# Patient Record
Sex: MALE | Race: BLACK OR AFRICAN AMERICAN | NOT HISPANIC OR LATINO | Employment: FULL TIME | URBAN - METROPOLITAN AREA
[De-identification: names, ages, dates, MRNs, and addresses within clinical notes are randomized per-mention and may not be internally consistent; named-entity substitution may affect disease eponyms.]

---

## 2024-05-13 ENCOUNTER — HOSPITAL ENCOUNTER (EMERGENCY)
Facility: HOSPITAL | Age: 49
Discharge: HOME | End: 2024-05-13
Attending: EMERGENCY MEDICINE
Payer: COMMERCIAL

## 2024-05-13 ENCOUNTER — CLINICAL SUPPORT (OUTPATIENT)
Dept: EMERGENCY MEDICINE | Facility: HOSPITAL | Age: 49
End: 2024-05-13
Payer: COMMERCIAL

## 2024-05-13 VITALS
WEIGHT: 186 LBS | SYSTOLIC BLOOD PRESSURE: 136 MMHG | OXYGEN SATURATION: 98 % | HEIGHT: 66 IN | TEMPERATURE: 98.7 F | DIASTOLIC BLOOD PRESSURE: 95 MMHG | BODY MASS INDEX: 29.89 KG/M2 | RESPIRATION RATE: 16 BRPM | HEART RATE: 76 BPM

## 2024-05-13 DIAGNOSIS — K62.5 RECTAL BLEEDING: Primary | ICD-10-CM

## 2024-05-13 LAB
ALBUMIN SERPL BCP-MCNC: 4.7 G/DL (ref 3.4–5)
ALP SERPL-CCNC: 52 U/L (ref 33–120)
ALT SERPL W P-5'-P-CCNC: 36 U/L (ref 10–52)
ANION GAP SERPL CALC-SCNC: 16 MMOL/L (ref 10–20)
AST SERPL W P-5'-P-CCNC: 16 U/L (ref 9–39)
BASOPHILS # BLD AUTO: 0.06 X10*3/UL (ref 0–0.1)
BASOPHILS NFR BLD AUTO: 1 %
BILIRUB SERPL-MCNC: 1.2 MG/DL (ref 0–1.2)
BUN SERPL-MCNC: 15 MG/DL (ref 6–23)
CALCIUM SERPL-MCNC: 9.8 MG/DL (ref 8.6–10.6)
CHLORIDE SERPL-SCNC: 101 MMOL/L (ref 98–107)
CO2 SERPL-SCNC: 23 MMOL/L (ref 21–32)
CREAT SERPL-MCNC: 1.08 MG/DL (ref 0.5–1.3)
EGFRCR SERPLBLD CKD-EPI 2021: 84 ML/MIN/1.73M*2
EOSINOPHIL # BLD AUTO: 0.24 X10*3/UL (ref 0–0.7)
EOSINOPHIL NFR BLD AUTO: 4.1 %
ERYTHROCYTE [DISTWIDTH] IN BLOOD BY AUTOMATED COUNT: 12.2 % (ref 11.5–14.5)
GLUCOSE SERPL-MCNC: 279 MG/DL (ref 74–99)
HCT VFR BLD AUTO: 42.9 % (ref 41–52)
HGB BLD-MCNC: 15.3 G/DL (ref 13.5–17.5)
IMM GRANULOCYTES # BLD AUTO: 0.01 X10*3/UL (ref 0–0.7)
IMM GRANULOCYTES NFR BLD AUTO: 0.2 % (ref 0–0.9)
LYMPHOCYTES # BLD AUTO: 0.89 X10*3/UL (ref 1.2–4.8)
LYMPHOCYTES NFR BLD AUTO: 15.3 %
MCH RBC QN AUTO: 29.1 PG (ref 26–34)
MCHC RBC AUTO-ENTMCNC: 35.7 G/DL (ref 32–36)
MCV RBC AUTO: 82 FL (ref 80–100)
MONOCYTES # BLD AUTO: 0.41 X10*3/UL (ref 0.1–1)
MONOCYTES NFR BLD AUTO: 7 %
NEUTROPHILS # BLD AUTO: 4.21 X10*3/UL (ref 1.2–7.7)
NEUTROPHILS NFR BLD AUTO: 72.4 %
NRBC BLD-RTO: 0 /100 WBCS (ref 0–0)
PLATELET # BLD AUTO: 293 X10*3/UL (ref 150–450)
POTASSIUM SERPL-SCNC: 4.3 MMOL/L (ref 3.5–5.3)
PROT SERPL-MCNC: 7.5 G/DL (ref 6.4–8.2)
RBC # BLD AUTO: 5.25 X10*6/UL (ref 4.5–5.9)
SODIUM SERPL-SCNC: 136 MMOL/L (ref 136–145)
WBC # BLD AUTO: 5.8 X10*3/UL (ref 4.4–11.3)

## 2024-05-13 PROCEDURE — 85025 COMPLETE CBC W/AUTO DIFF WBC: CPT | Performed by: EMERGENCY MEDICINE

## 2024-05-13 PROCEDURE — 99283 EMERGENCY DEPT VISIT LOW MDM: CPT | Mod: 25

## 2024-05-13 PROCEDURE — 36415 COLL VENOUS BLD VENIPUNCTURE: CPT | Performed by: EMERGENCY MEDICINE

## 2024-05-13 PROCEDURE — 93005 ELECTROCARDIOGRAM TRACING: CPT

## 2024-05-13 PROCEDURE — 80053 COMPREHEN METABOLIC PANEL: CPT | Performed by: EMERGENCY MEDICINE

## 2024-05-13 PROCEDURE — 99283 EMERGENCY DEPT VISIT LOW MDM: CPT | Performed by: EMERGENCY MEDICINE

## 2024-05-13 ASSESSMENT — PAIN SCALES - GENERAL
PAINLEVEL_OUTOF10: 0 - NO PAIN
PAINLEVEL_OUTOF10: 8

## 2024-05-13 ASSESSMENT — COLUMBIA-SUICIDE SEVERITY RATING SCALE - C-SSRS
2. HAVE YOU ACTUALLY HAD ANY THOUGHTS OF KILLING YOURSELF?: NO
6. HAVE YOU EVER DONE ANYTHING, STARTED TO DO ANYTHING, OR PREPARED TO DO ANYTHING TO END YOUR LIFE?: NO
1. IN THE PAST MONTH, HAVE YOU WISHED YOU WERE DEAD OR WISHED YOU COULD GO TO SLEEP AND NOT WAKE UP?: NO

## 2024-05-13 ASSESSMENT — PAIN - FUNCTIONAL ASSESSMENT: PAIN_FUNCTIONAL_ASSESSMENT: 0-10

## 2024-05-13 NOTE — ED TRIAGE NOTES
Patient has had dark red rectal bleeding that he notices even he is not having a bm x1 month. Intermittent lower abdominal pain. Patient also complains of being lightheaded and dizzy

## 2024-05-13 NOTE — ED PROVIDER NOTES
EMERGENCY DEPARTMENT ENCOUNTER      Pt Name: Guanakito Mahmood  MRN: [id number]  Birthdate 1975  Date of evaluation: 5/13/2024  Provider: Kam Mary MD    CHIEF COMPLAINT       Chief Complaint   Patient presents with    Rectal Bleeding         HISTORY OF PRESENT ILLNESS    48-year-old male with history of HTN and HLD presenting to the ED with complaint of rectal bleeding.  He reports that he has had rectal bleeding since 2016 in which it has become more frequent.  Denies any known hemorrhoids.  Notes there is bright red blood when he wipes and sometimes mixed in the stool in which she feels soreness when he has a bowel movement.  States that the blood occurs with both hard and soft stools.  Also endorses intermittent lower abdominal pain but none currently.  He did have a bowel movement this morning with only a small amount of blood he says.  Denies chest pain, shortness of breath, nausea, vomiting, weakness, or urinary symptoms.      History provided by:  Patient      Nursing Notes were reviewed.    PAST MEDICAL HISTORY   No past medical history on file.      SURGICAL HISTORY     No past surgical history on file.      CURRENT MEDICATIONS       Previous Medications    No medications on file       ALLERGIES     Peanut and Penicillins    FAMILY HISTORY     No family history on file.       SOCIAL HISTORY       Social History     Socioeconomic History    Marital status: Single     Spouse name: Not on file    Number of children: Not on file    Years of education: Not on file    Highest education level: Not on file   Occupational History    Not on file   Tobacco Use    Smoking status: Not on file    Smokeless tobacco: Not on file   Substance and Sexual Activity    Alcohol use: Not on file    Drug use: Not on file    Sexual activity: Not on file   Other Topics Concern    Not on file   Social History Narrative    Not on file     Social Determinants of Health     Financial Resource Strain: Not on file   Food Insecurity:  Not on file   Transportation Needs: Not on file   Physical Activity: Not on file   Stress: Not on file   Social Connections: Not on file   Intimate Partner Violence: Not on file   Housing Stability: Not on file       SCREENINGS                        PHYSICAL EXAM    (up to 7 for level 4, 8 or more for level 5)     ED Triage Vitals [05/13/24 1610]   Temperature Heart Rate Respirations BP   37.1 °C (98.7 °F) (!) 104 16 126/69      Pulse Ox Temp src Heart Rate Source Patient Position   98 % -- -- --      BP Location FiO2 (%)     -- --       Physical Exam  Vitals and nursing note reviewed. Exam conducted with a chaperone present (STEVEN Buchanan).   Constitutional:       General: He is not in acute distress.     Appearance: He is well-developed.   HENT:      Head: Normocephalic and atraumatic.      Right Ear: External ear normal.      Left Ear: External ear normal.      Nose: Nose normal. No congestion or rhinorrhea.      Mouth/Throat:      Mouth: Mucous membranes are moist.      Pharynx: No oropharyngeal exudate or posterior oropharyngeal erythema.   Eyes:      General:         Right eye: No discharge.         Left eye: No discharge.      Extraocular Movements: Extraocular movements intact.      Conjunctiva/sclera: Conjunctivae normal.   Cardiovascular:      Rate and Rhythm: Normal rate and regular rhythm.      Pulses: Normal pulses.      Heart sounds: No murmur heard.     No friction rub.   Pulmonary:      Effort: Pulmonary effort is normal. No respiratory distress.      Breath sounds: Normal breath sounds. No stridor. No wheezing or rales.   Abdominal:      General: There is no distension.      Palpations: Abdomen is soft.      Tenderness: There is no abdominal tenderness. There is no right CVA tenderness, left CVA tenderness or guarding.   Genitourinary:     Rectum: No tenderness, external hemorrhoid or internal hemorrhoid.      Comments: Skin tag at inferior border  Musculoskeletal:         General: No swelling,  tenderness, deformity or signs of injury. Normal range of motion.      Cervical back: Neck supple.      Right lower leg: No edema.      Left lower leg: No edema.   Skin:     General: Skin is warm and dry.      Capillary Refill: Capillary refill takes less than 2 seconds.      Coloration: Skin is not jaundiced or pale.      Findings: No lesion or rash.   Neurological:      General: No focal deficit present.      Mental Status: He is alert. Mental status is at baseline.      Cranial Nerves: No cranial nerve deficit.      Sensory: No sensory deficit.      Motor: No weakness.   Psychiatric:         Mood and Affect: Mood normal.         Behavior: Behavior normal.         Thought Content: Thought content normal.         Judgment: Judgment normal.          DIAGNOSTIC RESULTS     LABS:  Labs Reviewed   CBC WITH AUTO DIFFERENTIAL - Abnormal       Result Value    WBC 5.8      nRBC 0.0      RBC 5.25      Hemoglobin 15.3      Hematocrit 42.9      MCV 82      MCH 29.1      MCHC 35.7      RDW 12.2      Platelets 293      Neutrophils % 72.4      Immature Granulocytes %, Automated 0.2      Lymphocytes % 15.3      Monocytes % 7.0      Eosinophils % 4.1      Basophils % 1.0      Neutrophils Absolute 4.21      Immature Granulocytes Absolute, Automated 0.01      Lymphocytes Absolute 0.89 (*)     Monocytes Absolute 0.41      Eosinophils Absolute 0.24      Basophils Absolute 0.06     COMPREHENSIVE METABOLIC PANEL       All other labs were within normal range or not returned as of this dictation.    Imaging  No orders to display        Procedures  Procedures     EMERGENCY DEPARTMENT COURSE/MDM:     ED Course as of 05/13/24 2031   Mon May 13, 2024   2005 Heart Rate: 76 [ES]   2027 HEMOGLOBIN: 15.3 [ES]      ED Course User Index  [ES] Kam Mary MD         Diagnoses as of 05/13/24 2031   Rectal bleeding        Medical Decision Making  48-year-old male presenting to the ED with complaint of bright red rectal bleeding intermittently since  2016.  Patient is tachycardic at 104 but otherwise afebrile, hemodynamically stable on room air, and in no acute distress. On exam he is not tachycardic and well-appearing. Rectal exam unimpressive for obvious hemorrhoid.  Patient does not endorse tearing pain when having a bowel movement and states bleeding occurs with both hard and soft stools.  Basic labs ordered due to tachycardia.    Labs returned unimpressive for systemic inflammation or infection, acute anemia or blood loss, AMBREEN, hepatitis, or electrolyte abnormalities.  Patient updated on findings and recommendation for gastroenterology to follow-up with regarding rectal bleeding and further evaluation and management.  Patient is agreeable with plan.  Strict return precautions provided.  Case and plan discussed in its entirety with attending, Dr. Mayes.        Patient and or family in agreement and understanding of treatment plan.  All questions answered.      I reviewed the case with the attending ED physician. The attending ED physician agrees with the plan. Patient and/or patient´s representative was counseled regarding labs, imaging, likely diagnosis, and plan. All questions were answered.    ED Medications administered this visit:  Medications - No data to display    New Prescriptions from this visit:    New Prescriptions    No medications on file       Follow-up:   Gastroenterology  [phone]  Schedule an appointment as soon as possible for a visit           Final Impression:   1. Rectal bleeding          (Please note that portions of this note were completed with a voice recognition program.  Efforts were made to edit the dictations but occasionally words are mis-transcribed.)     Kam Mary MD  Resident  05/13/24 2031

## 2024-05-14 LAB
ATRIAL RATE: 93 BPM
P AXIS: 61 DEGREES
P OFFSET: 218 MS
P ONSET: 165 MS
PR INTERVAL: 120 MS
Q ONSET: 225 MS
QRS COUNT: 16 BEATS
QRS DURATION: 88 MS
QT INTERVAL: 344 MS
QTC CALCULATION(BAZETT): 427 MS
QTC FREDERICIA: 398 MS
R AXIS: 15 DEGREES
T AXIS: 0 DEGREES
T OFFSET: 397 MS
VENTRICULAR RATE: 93 BPM

## 2024-05-14 NOTE — DISCHARGE INSTRUCTIONS
Seek immediate medical attention should you have:  shortness of breath, chest pain, weakness, confusion, vomiting, or any worsening or concerning symptoms.

## 2024-06-20 ENCOUNTER — OFFICE VISIT (OUTPATIENT)
Dept: GASTROENTEROLOGY | Facility: HOSPITAL | Age: 49
End: 2024-06-20
Payer: COMMERCIAL

## 2024-06-20 VITALS
WEIGHT: 184 LBS | TEMPERATURE: 97.3 F | DIASTOLIC BLOOD PRESSURE: 91 MMHG | BODY MASS INDEX: 29.57 KG/M2 | HEART RATE: 78 BPM | SYSTOLIC BLOOD PRESSURE: 149 MMHG | HEIGHT: 66 IN | OXYGEN SATURATION: 96 %

## 2024-06-20 DIAGNOSIS — K64.9 BLEEDING HEMORRHOID: Primary | ICD-10-CM

## 2024-06-20 DIAGNOSIS — K59.09 CHRONIC CONSTIPATION: ICD-10-CM

## 2024-06-20 PROCEDURE — 99203 OFFICE O/P NEW LOW 30 MIN: CPT | Performed by: INTERNAL MEDICINE

## 2024-06-20 PROCEDURE — 1036F TOBACCO NON-USER: CPT | Performed by: INTERNAL MEDICINE

## 2024-06-20 PROCEDURE — 99213 OFFICE O/P EST LOW 20 MIN: CPT | Performed by: INTERNAL MEDICINE

## 2024-06-20 RX ORDER — AMLODIPINE BESYLATE 10 MG/1
10 TABLET ORAL DAILY
COMMUNITY

## 2024-06-20 RX ORDER — PRAVASTATIN SODIUM 40 MG/1
40 TABLET ORAL
COMMUNITY

## 2024-06-20 ASSESSMENT — ENCOUNTER SYMPTOMS
LIGHT-HEADEDNESS: 0
FEVER: 0
SHORTNESS OF BREATH: 0
CHILLS: 0
ROS GI COMMENTS: SEE HPI
TROUBLE SWALLOWING: 0
COLOR CHANGE: 0
COUGH: 0

## 2024-06-20 ASSESSMENT — PAIN SCALES - GENERAL: PAINLEVEL: 0-NO PAIN

## 2024-06-20 NOTE — PATIENT INSTRUCTIONS
Refer to colorectal surgery office to discuss hemorrhoid treatment.  Continue taking stool softeners for constipation.  Follow-up as needed.

## 2024-06-20 NOTE — PROGRESS NOTES
Subjective     History of Present Illness:   Guanakito Mahmood is a 49 y.o. male with hypertension, diabetes mellitus, dyslipidemia, chronic constipation, and colon polyp who presented to GI clinic for consultation.  Patient reported having intermittent rectal bleeding since 2016.  Patient has seen GI provider at [facility] previously who felt that he has hemorrhoidal bleeding.  Colonoscopy at [facility] on 3/17/23 showed one tubular adenoma and hemorrhoids; recommended surveillance in 7 years.  Patient reports having chronic constipation for which `he takes stool softeners and it helps him to have a BM daily.  Patient has tried hemorrhoidal cream and suppository but the effect only helps temporarily.  He would like to pursue further hemorrhoid therapy such as surgery or procedural intervention.  Patient denies having nausea, vomiting, abdominal pain, or weight loss.    Review of Systems  Review of Systems   Constitutional:  Negative for chills and fever.   HENT:  Negative for trouble swallowing.    Respiratory:  Negative for cough and shortness of breath.    Cardiovascular:  Negative for chest pain.   Gastrointestinal:         See HPI   Skin:  Negative for color change and rash.   Neurological:  Negative for light-headedness.       Past Medical History   has no past medical history on file.     Social History   reports that he has never smoked. He has never used smokeless tobacco. He reports current alcohol use. He reports that he does not use drugs.     Family History  family history is not on file.   No family history of colon cancer.    Allergies  Allergies   Allergen Reactions    Peanut Anaphylaxis    Penicillins Rash       Medications  Current Outpatient Medications   Medication Instructions    amLODIPine (NORVASC) 10 mg, oral, Daily    OMEPRAZOLE ORAL oral    pravastatin (PRAVACHOL) 40 mg, oral, Daily RT        Objective   Visit Vitals  BP (!) 149/91   Pulse 78   Temp 36.3 °C (97.3 °F)      Body mass index is 29.7  kg/m².  Physical Exam  Constitutional:       General: He is not in acute distress.     Appearance: Normal appearance.   HENT:      Head: Normocephalic and atraumatic.      Mouth/Throat:      Mouth: Mucous membranes are moist.   Eyes:      General: No scleral icterus.  Cardiovascular:      Rate and Rhythm: Normal rate and regular rhythm.      Heart sounds: No murmur heard.  Pulmonary:      Effort: Pulmonary effort is normal.      Breath sounds: Normal breath sounds.   Abdominal:      General: Bowel sounds are normal.      Palpations: Abdomen is soft. There is no mass.      Tenderness: There is no abdominal tenderness. There is no guarding or rebound.   Musculoskeletal:         General: No swelling.      Cervical back: Neck supple.   Skin:     General: Skin is warm.      Coloration: Skin is not jaundiced.   Neurological:      Mental Status: He is alert and oriented to person, place, and time.   Psychiatric:         Mood and Affect: Mood normal.         Labs  Lab Results   Component Value Date    WBC 5.8 05/13/2024    HGB 15.3 05/13/2024    HCT 42.9 05/13/2024    MCV 82 05/13/2024     05/13/2024     Lab Results   Component Value Date    GLUCOSE 279 (H) 05/13/2024    CALCIUM 9.8 05/13/2024     05/13/2024    K 4.3 05/13/2024    CO2 23 05/13/2024     05/13/2024    BUN 15 05/13/2024    CREATININE 1.08 05/13/2024     Lab Results   Component Value Date    ALT 36 05/13/2024    AST 16 05/13/2024    ALKPHOS 52 05/13/2024    BILITOT 1.2 05/13/2024       Assessment   Guanakito Mahmood is a 49 y.o. male with hypertension, diabetes mellitus, dyslipidemia, chronic constipation, and colon polyp who presented to GI clinic for consultation.  Patient reports having intermittent rectal bleeding for the past 8 years from hemorrhoidal bleeding.  Colonoscopy in 2023 at [facility], [place] showed one polyp and hemorrhoids.  He has failed medical therapy.  Will refer patient to colorectal surgery to discuss further therapy such as banding  and/or surgery for his hemorrhoids.  Continue taking stool softeners to help with his chronic constipation.  Follow-up as needed.    Plan  Refer to colorectal surgery office to discuss hemorrhoid treatment.  Continue taking stool softeners for constipation.  Follow-up as needed.    Ramon Link MD

## 2024-07-16 ENCOUNTER — APPOINTMENT (OUTPATIENT)
Dept: SURGERY | Facility: CLINIC | Age: 49
End: 2024-07-16
Payer: COMMERCIAL

## 2024-07-16 VITALS
HEIGHT: 66 IN | BODY MASS INDEX: 29.91 KG/M2 | DIASTOLIC BLOOD PRESSURE: 100 MMHG | SYSTOLIC BLOOD PRESSURE: 145 MMHG | HEART RATE: 78 BPM | RESPIRATION RATE: 16 BRPM | WEIGHT: 186.1 LBS

## 2024-07-16 DIAGNOSIS — K60.2 ANAL FISSURE: Primary | ICD-10-CM

## 2024-07-16 DIAGNOSIS — K59.00 CONSTIPATION, UNSPECIFIED CONSTIPATION TYPE: ICD-10-CM

## 2024-07-16 PROCEDURE — 46600 DIAGNOSTIC ANOSCOPY SPX: CPT | Performed by: NURSE PRACTITIONER

## 2024-07-16 PROCEDURE — 99203 OFFICE O/P NEW LOW 30 MIN: CPT | Performed by: NURSE PRACTITIONER

## 2024-07-16 PROCEDURE — 3008F BODY MASS INDEX DOCD: CPT | Performed by: NURSE PRACTITIONER

## 2024-07-16 ASSESSMENT — PAIN SCALES - GENERAL: PAINLEVEL: 0-NO PAIN

## 2024-07-16 NOTE — PROGRESS NOTES
Chief complaint:  Bleeding hemorrhoids    History Of Present Illness  Subjective   Guanakito Mahmood was referred by Dr. Link  for evaluation of hemorrhoids.    Pt is here with his mother.    Pain: No  Protruding Tissue: No  Bleeding: Yes - BRBPR per every day. Notes blood on the toilet tissue and in the toilet bowel.      Bowel habits:  Moves bowels daily  Stool is hard  He does strains and spends > 10 minutes on the toilet to have a BM    Dietary history:   Eats a high fiber diet eats some fruits  Drinks approximately 64+ oz water daily  Exercise: dances once weekly   Fiber supplement: has never tried a fiber supplement  Started taking Miralax at the end of June once weekly.    Colonoscopy: Colonoscopy at [facility] 2023. One polyps and hemorrhoids. Repeat in 7 years per patient    PMH: HTN, DLD, DM  PSH: Denies an surgeries  Family history: Father with colon polyps  Tobacco use: Denies  Alcohol use: Drinks on the weekends. 2-3 drinks per day  Recreation drugs: Denies    Denies any anal trauma, anal sex, or anal surgeries.  Takes Ibuprofen. Last dose was yesterday.    Review of Systems    Constitutional: Negative for fever, chills, anorexia, weight loss, malaise   ENMT: Negative for nasal discharge, congestion, ear pain, mouth pain, throat pain  Respiratory: Negative for cough, hemoptysis, wheezing, shortness of breath  Cardiac: Negative for chest pain, dyspnea on exertion, orthopnea, palpitations, syncope. HTN, DLD  Gastrointestinal: Negative for nausea, vomiting, diarrhea, constipation, abdominal pain  Genitourinary: Negative for discharge, dysuria, flank pain, frequency, hematuria  Musculoskeletal: Negative for decreased ROM, pain, swelling, weakness   Neurological: Negative for dizziness, confusion, headache, seizures, syncope   Psychiatric: Negative for mood changes, anxiety, hallucinations, sleep changes, suicidal ideas  Skin: Negative for mass, pain, itching, rash, ulcer   Endocrine: Negative for heat  intolerance, cold intolerance, excessive sweating, polyuria, excess thirst. +DM  Hematologic/Lymph: Negative for anemia, bruising, easy bleeding, night sweats, petechiae, history of DVT/PE or cancer   Allergic/Immunologic: Negative for anaphylaxis, itchy/ teary eyes, itching, sneezing, swelling       Physical Exam  Constitutional: Well developed, awake/alert/oriented x3, no distress, alert and cooperative   Eyes: Sclera anicteric, no conjunctival inflammation, conjugate gaze   ENMT: mucous membranes moist, no apparent injury,   Head/Neck: Neck supple, no apparent injury, trachea midline, no bruits   Respiratory/Thorax: Patent airways, CTAB, normal breath sounds with good chest expansion  Cardiovascular: Regular, rate and rhythm, no murmurs, normal S1 and S2   Gastrointestinal: Nondistended, soft, non-tender  Extremities: normal extremities, no edema  Neurological: alert and oriented x3, normal strength, Normal gait   Psychological: Appropriate mood and behavior   Skin: Warm and dry, no lesions, no rashes   Anorectal: Normal tone, no external hemorrhoids or lesions, smooth mucosa. TASH was comfortable.    Patient ID: Guanakito Mahmood is a 49 y.o. male.    Anoscopy    Date/Time: 7/17/2024 3:27 PM    Performed by: CLAUDIA Thomas  Authorized by: CLAUDIA Thomas    Consent:     Consent obtained:  Verbal    Consent given by:  Patient    Risks, benefits, and alternatives were discussed: yes      Risks discussed:  Bleeding and pain  Universal protocol:     Procedure explained and questions answered to patient or proxy's satisfaction: yes      Patient identity confirmed:  Verbally with patient  Indications:     Indications: rectal bleeding    Procedure details:     Internal hemorrhoids: yes      Anal fissures: yes    Post-procedure details:     Procedure completion:  Tolerated  Comments:      Posterior anal fissure    A chaperone was present during the exam, ARMANI Meneses.  Once I was done with anoscopy I  questioned him again about pain with Bm and after BM. And he did admit to having pain.     Assessment:  Internal hemorrhoids  Posterior anal fissure  Constipation    Plan:  -Ok to continue taking Miralax daily with the Metamucil  -Nifedipine 0.2 % ointment. Apply to anus 2-3 times daily.  -Increase fluid intake: aim for 8-10, 8 oz glasses of water daily. Other beverages, such as juice, coffee, or tea may count toward this goal.  -Increase high fiber foods: fruits , vegetables, whole grains, and beans. Aim for 25-35 grams per day.  -Add a powder fiber supplement daily. Like Metamucil, Citrucel daily-follow instructions on the bottle.  -Avoiding sitting on the toilet for prolonged periods of time  -Avoid straining  -Follow-up in 6 weeks  -Follow up with PCP for elevated HGBAiC    LESTER Thomas-CNP

## 2024-07-16 NOTE — PATIENT INSTRUCTIONS
Posterior anal fissure    -Ok to continue taking Miralax daily with the Metamucil  -Nifedipine 0.2 % ointment. Apply to anus 2-3 times daily.  -Increase fluid intake: aim for 8-10, 8 oz glasses of water daily. Other beverages, such as juice, coffee, or tea may count toward this goal.  -Increase high fiber foods: fruits , vegetables, whole grains, and beans. Aim for 25-35 grams per day.  -Add a powder fiber supplement daily. Like Metamucil, Citrucel daily-follow instructions on the bottle.  -Avoiding sitting on the toilet for prolonged periods of time  -Avoid straining  -Follow-up in 6 weeks  -Follow up with PCP for elevated HGBAiC    It was a pleasure to see you in the office today.  If you have any questions or concerns, please let me know.    Sincerely,  Bianka Bravo  Certified Nurse Practitioner-Colorectal surgery    [email or web address]  [phone] (Confidential voice mail)

## 2024-07-17 PROCEDURE — 46600 DIAGNOSTIC ANOSCOPY SPX: CPT | Performed by: NURSE PRACTITIONER

## 2024-07-17 ASSESSMENT — ENCOUNTER SYMPTOMS: RECTAL BLEEDING: 1

## 2024-08-27 ENCOUNTER — APPOINTMENT (OUTPATIENT)
Dept: SURGERY | Facility: CLINIC | Age: 49
End: 2024-08-27
Payer: COMMERCIAL

## 2024-09-13 ENCOUNTER — HOSPITAL ENCOUNTER (EMERGENCY)
Facility: HOSPITAL | Age: 49
Discharge: HOME | End: 2024-09-14
Attending: EMERGENCY MEDICINE
Payer: COMMERCIAL

## 2024-09-13 DIAGNOSIS — F10.920 ALCOHOLIC INTOXICATION WITHOUT COMPLICATION (CMS-HCC): Primary | ICD-10-CM

## 2024-09-13 PROCEDURE — 99285 EMERGENCY DEPT VISIT HI MDM: CPT | Performed by: EMERGENCY MEDICINE

## 2024-09-13 PROCEDURE — 99283 EMERGENCY DEPT VISIT LOW MDM: CPT

## 2024-09-13 RX ORDER — ONDANSETRON HYDROCHLORIDE 2 MG/ML
4 INJECTION, SOLUTION INTRAVENOUS ONCE
Status: DISCONTINUED | OUTPATIENT
Start: 2024-09-13 | End: 2024-09-14

## 2024-09-13 ASSESSMENT — COLUMBIA-SUICIDE SEVERITY RATING SCALE - C-SSRS
4. HAVE YOU HAD THESE THOUGHTS AND HAD SOME INTENTION OF ACTING ON THEM?: NO
1. IN THE PAST MONTH, HAVE YOU WISHED YOU WERE DEAD OR WISHED YOU COULD GO TO SLEEP AND NOT WAKE UP?: YES
2. HAVE YOU ACTUALLY HAD ANY THOUGHTS OF KILLING YOURSELF?: NO
5. HAVE YOU STARTED TO WORK OUT OR WORKED OUT THE DETAILS OF HOW TO KILL YOURSELF? DO YOU INTEND TO CARRY OUT THIS PLAN?: NO
6. HAVE YOU EVER DONE ANYTHING, STARTED TO DO ANYTHING, OR PREPARED TO DO ANYTHING TO END YOUR LIFE?: NO

## 2024-09-14 ENCOUNTER — CLINICAL SUPPORT (OUTPATIENT)
Dept: EMERGENCY MEDICINE | Facility: HOSPITAL | Age: 49
End: 2024-09-14
Payer: COMMERCIAL

## 2024-09-14 VITALS
OXYGEN SATURATION: 96 % | TEMPERATURE: 97.3 F | DIASTOLIC BLOOD PRESSURE: 74 MMHG | RESPIRATION RATE: 18 BRPM | HEART RATE: 71 BPM | SYSTOLIC BLOOD PRESSURE: 124 MMHG

## 2024-09-14 LAB
ALBUMIN SERPL BCP-MCNC: 5 G/DL (ref 3.4–5)
ALP SERPL-CCNC: 44 U/L (ref 33–120)
ALT SERPL W P-5'-P-CCNC: 22 U/L (ref 10–52)
AMPHETAMINES UR QL SCN: NORMAL
ANION GAP SERPL CALC-SCNC: 20 MMOL/L (ref 10–20)
APAP SERPL-MCNC: <10 UG/ML
AST SERPL W P-5'-P-CCNC: 20 U/L (ref 9–39)
ATRIAL RATE: 75 BPM
BARBITURATES UR QL SCN: NORMAL
BASOPHILS # BLD AUTO: 0.03 X10*3/UL (ref 0–0.1)
BASOPHILS NFR BLD AUTO: 0.5 %
BENZODIAZ UR QL SCN: NORMAL
BILIRUB SERPL-MCNC: 0.6 MG/DL (ref 0–1.2)
BUN SERPL-MCNC: 11 MG/DL (ref 6–23)
BZE UR QL SCN: NORMAL
CALCIUM SERPL-MCNC: 9.5 MG/DL (ref 8.6–10.6)
CANNABINOIDS UR QL SCN: NORMAL
CHLORIDE SERPL-SCNC: 103 MMOL/L (ref 98–107)
CO2 SERPL-SCNC: 21 MMOL/L (ref 21–32)
CREAT SERPL-MCNC: 1.3 MG/DL (ref 0.5–1.3)
EGFRCR SERPLBLD CKD-EPI 2021: 67 ML/MIN/1.73M*2
EOSINOPHIL # BLD AUTO: 0.06 X10*3/UL (ref 0–0.7)
EOSINOPHIL NFR BLD AUTO: 0.9 %
ERYTHROCYTE [DISTWIDTH] IN BLOOD BY AUTOMATED COUNT: 12.6 % (ref 11.5–14.5)
ETHANOL SERPL-MCNC: 203 MG/DL
FENTANYL+NORFENTANYL UR QL SCN: NORMAL
GLUCOSE SERPL-MCNC: 203 MG/DL (ref 74–99)
HCT VFR BLD AUTO: 45.5 % (ref 41–52)
HGB BLD-MCNC: 14.8 G/DL (ref 13.5–17.5)
IMM GRANULOCYTES # BLD AUTO: 0.02 X10*3/UL (ref 0–0.7)
IMM GRANULOCYTES NFR BLD AUTO: 0.3 % (ref 0–0.9)
LYMPHOCYTES # BLD AUTO: 0.72 X10*3/UL (ref 1.2–4.8)
LYMPHOCYTES NFR BLD AUTO: 11.2 %
MCH RBC QN AUTO: 28.2 PG (ref 26–34)
MCHC RBC AUTO-ENTMCNC: 32.5 G/DL (ref 32–36)
MCV RBC AUTO: 87 FL (ref 80–100)
METHADONE UR QL SCN: NORMAL
MONOCYTES # BLD AUTO: 0.35 X10*3/UL (ref 0.1–1)
MONOCYTES NFR BLD AUTO: 5.4 %
NEUTROPHILS # BLD AUTO: 5.25 X10*3/UL (ref 1.2–7.7)
NEUTROPHILS NFR BLD AUTO: 81.7 %
NRBC BLD-RTO: 0 /100 WBCS (ref 0–0)
OPIATES UR QL SCN: NORMAL
OXYCODONE+OXYMORPHONE UR QL SCN: NORMAL
P AXIS: 65 DEGREES
P OFFSET: 217 MS
P ONSET: 155 MS
PCP UR QL SCN: NORMAL
PLATELET # BLD AUTO: 361 X10*3/UL (ref 150–450)
POTASSIUM SERPL-SCNC: 3.6 MMOL/L (ref 3.5–5.3)
PR INTERVAL: 140 MS
PROT SERPL-MCNC: 8 G/DL (ref 6.4–8.2)
Q ONSET: 225 MS
QRS COUNT: 12 BEATS
QRS DURATION: 92 MS
QT INTERVAL: 340 MS
QTC CALCULATION(BAZETT): 379 MS
QTC FREDERICIA: 366 MS
R AXIS: 17 DEGREES
RBC # BLD AUTO: 5.24 X10*6/UL (ref 4.5–5.9)
SALICYLATES SERPL-MCNC: <3 MG/DL
SODIUM SERPL-SCNC: 140 MMOL/L (ref 136–145)
T AXIS: 12 DEGREES
T OFFSET: 395 MS
VENTRICULAR RATE: 75 BPM
WBC # BLD AUTO: 6.4 X10*3/UL (ref 4.4–11.3)

## 2024-09-14 PROCEDURE — 80307 DRUG TEST PRSMV CHEM ANLYZR: CPT

## 2024-09-14 PROCEDURE — 93005 ELECTROCARDIOGRAM TRACING: CPT

## 2024-09-14 PROCEDURE — 80143 DRUG ASSAY ACETAMINOPHEN: CPT

## 2024-09-14 PROCEDURE — 36415 COLL VENOUS BLD VENIPUNCTURE: CPT

## 2024-09-14 PROCEDURE — 85025 COMPLETE CBC W/AUTO DIFF WBC: CPT

## 2024-09-14 PROCEDURE — 80053 COMPREHEN METABOLIC PANEL: CPT

## 2024-09-14 PROCEDURE — 2500000005 HC RX 250 GENERAL PHARMACY W/O HCPCS

## 2024-09-14 RX ORDER — ONDANSETRON 4 MG/1
4 TABLET, ORALLY DISINTEGRATING ORAL ONCE
Status: COMPLETED | OUTPATIENT
Start: 2024-09-14 | End: 2024-09-14

## 2024-09-14 RX ADMIN — ONDANSETRON 4 MG: 4 TABLET, ORALLY DISINTEGRATING ORAL at 00:51
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
## 2024-09-14 NOTE — DISCHARGE INSTRUCTIONS
You were seen in the emergency department for intoxication.  Your labs are no concerning findings and you will be discharged.  Please come back to the emergency department if you have any new or worsening symptoms.
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
## 2024-09-17 ENCOUNTER — APPOINTMENT (OUTPATIENT)
Dept: SURGERY | Facility: CLINIC | Age: 49
End: 2024-09-17
Payer: COMMERCIAL

## 2025-10-20 ENCOUNTER — APPOINTMENT (OUTPATIENT)
Dept: PRIMARY CARE | Facility: CLINIC | Age: 50
End: 2025-10-20
Payer: COMMERCIAL